# Patient Record
Sex: FEMALE | Employment: OTHER | URBAN - METROPOLITAN AREA
[De-identification: names, ages, dates, MRNs, and addresses within clinical notes are randomized per-mention and may not be internally consistent; named-entity substitution may affect disease eponyms.]

---

## 2021-02-08 ENCOUNTER — TELEPHONE ENCOUNTER (OUTPATIENT)
Dept: URBAN - METROPOLITAN AREA CLINIC 9 | Facility: CLINIC | Age: 81
End: 2021-02-08

## 2021-03-05 ENCOUNTER — OFFICE VISIT (OUTPATIENT)
Age: 81
End: 2021-03-05

## 2021-04-20 ENCOUNTER — TELEPHONE ENCOUNTER (OUTPATIENT)
Dept: URBAN - METROPOLITAN AREA CLINIC 9 | Facility: CLINIC | Age: 81
End: 2021-04-20

## 2021-04-20 ENCOUNTER — OFFICE VISIT (OUTPATIENT)
Dept: URBAN - METROPOLITAN AREA CLINIC 7 | Facility: CLINIC | Age: 81
End: 2021-04-20

## 2021-05-04 ENCOUNTER — TELEPHONE ENCOUNTER (OUTPATIENT)
Dept: URBAN - METROPOLITAN AREA CLINIC 9 | Facility: CLINIC | Age: 81
End: 2021-05-04

## 2021-05-18 ENCOUNTER — TELEPHONE ENCOUNTER (OUTPATIENT)
Dept: URBAN - METROPOLITAN AREA CLINIC 9 | Facility: CLINIC | Age: 81
End: 2021-05-18

## 2021-05-20 ENCOUNTER — TELEPHONE ENCOUNTER (OUTPATIENT)
Dept: URBAN - METROPOLITAN AREA CLINIC 9 | Facility: CLINIC | Age: 81
End: 2021-05-20

## 2021-10-25 ENCOUNTER — TELEPHONE ENCOUNTER (OUTPATIENT)
Dept: URBAN - METROPOLITAN AREA CLINIC 9 | Facility: CLINIC | Age: 81
End: 2021-10-25

## 2021-11-01 ENCOUNTER — TELEPHONE ENCOUNTER (OUTPATIENT)
Dept: URBAN - METROPOLITAN AREA CLINIC 9 | Facility: CLINIC | Age: 81
End: 2021-11-01

## 2021-12-20 ENCOUNTER — TELEPHONE ENCOUNTER (OUTPATIENT)
Dept: URBAN - METROPOLITAN AREA CLINIC 9 | Facility: CLINIC | Age: 81
End: 2021-12-20

## 2022-02-01 ENCOUNTER — TELEPHONE ENCOUNTER (OUTPATIENT)
Dept: URBAN - METROPOLITAN AREA CLINIC 9 | Facility: CLINIC | Age: 82
End: 2022-02-01

## 2022-02-02 ENCOUNTER — TELEPHONE ENCOUNTER (OUTPATIENT)
Dept: URBAN - METROPOLITAN AREA CLINIC 9 | Facility: CLINIC | Age: 82
End: 2022-02-02

## 2022-07-30 ENCOUNTER — TELEPHONE ENCOUNTER (OUTPATIENT)
Age: 82
End: 2022-07-30

## 2022-07-30 RX ORDER — MESALAMINE 1.2 G/1
3 (THREE) TABLET DR TABLET, DELAYED RELEASE ORAL DAILY
Qty: 0 | Refills: 2 | OUTPATIENT
Start: 2016-11-22 | End: 2019-11-08

## 2022-07-30 RX ORDER — BUDESONIDE 3 MG/1
3 (THREE) CAPSULE, COATED PELLETS ORAL DAILY
Qty: 0 | Refills: 2 | OUTPATIENT
Start: 2019-11-18 | End: 2019-12-18

## 2022-07-30 RX ORDER — MESALAMINE 1.2 G/1
3 (THREE) TABLET DR TABLET, DELAYED RELEASE ORAL DAILY
Qty: 0 | Refills: 2 | OUTPATIENT
Start: 2021-11-01 | End: 2021-12-17

## 2022-07-31 ENCOUNTER — TELEPHONE ENCOUNTER (OUTPATIENT)
Age: 82
End: 2022-07-31

## 2022-07-31 RX ORDER — MESALAMINE 1.2 G/1
3 (THREE) TABLET, DELAYED RELEASE ORAL DAILY
Qty: 0 | Refills: 2 | Status: ACTIVE | COMMUNITY
Start: 2020-02-07

## 2022-07-31 RX ORDER — MESALAMINE 1.2 G/1
3 (THREE) TABLET, DELAYED RELEASE ORAL DAILY
Qty: 0 | Refills: 16 | Status: ACTIVE | COMMUNITY
Start: 2021-04-20

## 2022-07-31 RX ORDER — MESALAMINE 1.2 G/1
3 (THREE) TABLET, DELAYED RELEASE ORAL DAILY
Qty: 0 | Refills: 5 | Status: ACTIVE | COMMUNITY
Start: 2016-10-18

## 2022-07-31 RX ORDER — MESALAMINE 1.2 G/1
3 (THREE) TABLET, DELAYED RELEASE ORAL DAILY
Qty: 0 | Refills: 2 | Status: ACTIVE | COMMUNITY
Start: 2021-02-08

## 2022-07-31 RX ORDER — BISMUTH SUBSALICYLATE 262 MG/1
1 (ONE) TABLET, CHEWABLE ORAL
Qty: 0 | Refills: 5 | Status: ACTIVE | COMMUNITY
Start: 2020-03-19

## 2022-07-31 RX ORDER — MESALAMINE 1.2 G/1
3 (THREE) TABLET, DELAYED RELEASE ORAL DAILY
Qty: 0 | Refills: 16 | Status: ACTIVE | COMMUNITY
Start: 2021-11-01

## 2022-07-31 RX ORDER — BISMUTH SUBSALICYLATE 262 MG/1
1 (ONE) TABLET CHEWABLE TABLET, CHEWABLE ORAL
Qty: 0 | Refills: 5 | Status: ACTIVE | COMMUNITY
Start: 2016-10-18

## 2022-07-31 RX ORDER — MESALAMINE 1.2 G/1
3 (THREE) TABLET, DELAYED RELEASE ORAL DAILY
Qty: 0 | Refills: 2 | Status: ACTIVE | COMMUNITY
Start: 2020-02-10

## 2022-07-31 RX ORDER — MESALAMINE 1.2 G/1
3 (THREE) TABLET, DELAYED RELEASE ORAL DAILY
Qty: 0 | Refills: 2 | Status: ACTIVE | COMMUNITY
Start: 2016-11-04

## 2022-07-31 RX ORDER — BUDESONIDE 3 MG/1
3 (THREE) CAPSULE, COATED PELLETS ORAL DAILY
Qty: 0 | Refills: 2 | Status: ACTIVE | COMMUNITY
Start: 2020-03-19

## 2022-07-31 RX ORDER — BUDESONIDE 3 MG/1
3 (THREE) CAPSULE, COATED PELLETS ORAL DAILY
Qty: 0 | Refills: 2 | Status: ACTIVE | COMMUNITY
Start: 2020-04-15

## 2022-07-31 RX ORDER — MESALAMINE 1.2 G/1
3 (THREE) TABLET, DELAYED RELEASE ORAL DAILY
Qty: 0 | Refills: 2 | Status: ACTIVE | COMMUNITY
Start: 2021-12-17

## 2022-07-31 RX ORDER — BUDESONIDE 3 MG/1
3 (THREE) CAPSULE, COATED PELLETS ORAL DAILY
Qty: 0 | Refills: 2 | Status: ACTIVE | COMMUNITY
Start: 2021-04-20

## 2022-07-31 RX ORDER — MESALAMINE 1.2 G/1
3 (THREE) TABLET, DELAYED RELEASE ORAL DAILY
Qty: 0 | Refills: 2 | Status: ACTIVE | COMMUNITY
Start: 2021-12-20

## 2022-07-31 RX ORDER — MESALAMINE 1.2 G/1
3 (THREE) TABLET, DELAYED RELEASE ORAL DAILY
Qty: 0 | Refills: 5 | Status: ACTIVE | COMMUNITY
Start: 2019-11-08

## 2022-07-31 RX ORDER — MESALAMINE 1.2 G/1
3 (THREE) TABLET, DELAYED RELEASE ORAL DAILY
Qty: 0 | Refills: 16 | Status: ACTIVE | COMMUNITY
Start: 2021-04-27

## 2022-07-31 RX ORDER — BUDESONIDE 3 MG/1
3 (THREE) CAPSULE, COATED PELLETS ORAL DAILY
Qty: 0 | Refills: 2 | Status: ACTIVE | COMMUNITY
Start: 2019-11-18

## 2022-07-31 RX ORDER — MESALAMINE 1.2 G/1
3 (THREE) TABLET DR TABLET, DELAYED RELEASE ORAL DAILY
Qty: 0 | Refills: 2 | Status: ACTIVE | COMMUNITY
Start: 2021-11-01

## 2022-07-31 RX ORDER — MESALAMINE 1.2 G/1
3 (THREE) TABLET, DELAYED RELEASE ORAL DAILY
Qty: 0 | Refills: 2 | Status: ACTIVE | COMMUNITY
Start: 2016-11-22

## 2022-07-31 RX ORDER — MESALAMINE 1.2 G/1
3 (THREE) TABLET, DELAYED RELEASE ORAL DAILY
Qty: 0 | Refills: 2 | Status: ACTIVE | COMMUNITY
Start: 2022-02-02

## 2022-07-31 RX ORDER — BISMUTH SUBSALICYLATE 262 MG/1
1 (ONE) TABLET, CHEWABLE ORAL
Qty: 0 | Refills: 5 | Status: ACTIVE | COMMUNITY
Start: 2020-04-15

## 2022-07-31 RX ORDER — MESALAMINE 1.2 G/1
3 (THREE) TABLET, DELAYED RELEASE ORAL DAILY
Qty: 0 | Refills: 2 | Status: ACTIVE | COMMUNITY
Start: 2016-11-03

## 2023-02-22 ENCOUNTER — ERX REFILL RESPONSE (OUTPATIENT)
Dept: URBAN - METROPOLITAN AREA CLINIC 7 | Facility: CLINIC | Age: 83
End: 2023-02-22

## 2023-02-22 RX ORDER — MESALAMINE 1.2 G/1
3 (THREE) TABLET, DELAYED RELEASE ORAL DAILY
Qty: 0 | Refills: 16 | OUTPATIENT

## 2023-02-22 RX ORDER — MESALAMINE 1.2 G/1
TAKE 3 TABLETS DAILY TABLET, DELAYED RELEASE ORAL
Qty: 270 TABLET | Refills: 3 | OUTPATIENT

## 2024-04-19 ENCOUNTER — OV EP (OUTPATIENT)
Dept: URBAN - METROPOLITAN AREA CLINIC 7 | Facility: CLINIC | Age: 84
End: 2024-04-19
Payer: MEDICARE

## 2024-04-19 VITALS
BODY MASS INDEX: 27.29 KG/M2 | TEMPERATURE: 97 F | HEIGHT: 60 IN | WEIGHT: 139 LBS | SYSTOLIC BLOOD PRESSURE: 12 MMHG | DIASTOLIC BLOOD PRESSURE: 84 MMHG

## 2024-04-19 DIAGNOSIS — M19.90 OSTEOARTHRITIS, UNSPECIFIED OSTEOARTHRITIS TYPE, UNSPECIFIED SITE: ICD-10-CM

## 2024-04-19 DIAGNOSIS — K52.831 COLLAGENOUS COLITIS: ICD-10-CM

## 2024-04-19 PROCEDURE — 99213 OFFICE O/P EST LOW 20 MIN: CPT | Performed by: INTERNAL MEDICINE

## 2024-04-19 RX ORDER — BUDESONIDE 3 MG/1
3 (THREE) CAPSULE, COATED PELLETS ORAL DAILY
Qty: 0 | Refills: 2 | Status: DISCONTINUED | COMMUNITY
Start: 2020-04-15

## 2024-04-19 RX ORDER — MESALAMINE 1.2 G/1
2 TABLES DAILY TABLET, DELAYED RELEASE ORAL DAILY
Qty: 180 TABLET | Refills: 3
Start: 2016-11-04 | End: 2025-04-14

## 2024-04-19 RX ORDER — OXYBUTYNIN CHLORIDE 5 MG/1
TABLET, EXTENDED RELEASE ORAL
Qty: 90 TABLET | Status: ACTIVE | COMMUNITY

## 2024-04-19 RX ORDER — BISMUTH SUBSALICYLATE 262 MG/1
1 (ONE) TABLET, CHEWABLE ORAL
Qty: 0 | Refills: 5 | Status: DISCONTINUED | COMMUNITY
Start: 2020-03-19

## 2024-04-19 RX ORDER — MESALAMINE 1.2 G/1
3 (THREE) TABLET, DELAYED RELEASE ORAL DAILY
Qty: 0 | Refills: 2 | Status: ACTIVE | COMMUNITY
Start: 2016-11-04

## 2024-04-19 RX ORDER — MESALAMINE 1.2 G/1
TAKE 3 TABLETS DAILY TABLET, DELAYED RELEASE ORAL
Qty: 270 TABLET | Refills: 3 | Status: ACTIVE | COMMUNITY

## 2024-04-19 RX ORDER — SULFACETAMIDE SODIUM 100 MG/ML
SOLUTION/ DROPS OPHTHALMIC
Qty: 15 UNSPECIFIED | Status: ACTIVE | COMMUNITY

## 2024-04-19 RX ORDER — ESTRADIOL 0.1 MG/G
CREAM VAGINAL
Qty: 42 UNSPECIFIED | Status: ACTIVE | COMMUNITY

## 2024-04-19 NOTE — HPI-TODAY'S VISIT:
Prior patient Dr Arroyo s/p prior work up for chronic  diarrhea with dx of collagenous colitis made. Doing well on lialda 2.4g daily Having 2-3 formed bms daily. No overt bleeding. No fevers or chills. No night sweats. No weight loss. No flushing. No nausea or vomiting. No travel,sick contacts or new medications. No recent dietary changes. No excessive caffeine intake . No excessive artificial sweetener use including sorbitol and diabetic sweetened foods.

## 2025-03-10 ENCOUNTER — ERX REFILL RESPONSE (OUTPATIENT)
Dept: URBAN - METROPOLITAN AREA CLINIC 7 | Facility: CLINIC | Age: 85
End: 2025-03-10

## 2025-03-10 RX ORDER — MESALAMINE 1.2 G/1
2 TABLES DAILY TABLET, DELAYED RELEASE ORAL DAILY
Qty: 180 TABLET | Refills: 3 | OUTPATIENT

## 2025-03-10 RX ORDER — MESALAMINE 1.2 G/1
TAKE 2 TABLETS DAILY TABLET, DELAYED RELEASE ORAL
Qty: 180 TABLET | Refills: 3 | OUTPATIENT